# Patient Record
Sex: MALE | Race: WHITE | NOT HISPANIC OR LATINO | Employment: FULL TIME | URBAN - NONMETROPOLITAN AREA
[De-identification: names, ages, dates, MRNs, and addresses within clinical notes are randomized per-mention and may not be internally consistent; named-entity substitution may affect disease eponyms.]

---

## 2022-05-17 ENCOUNTER — OFFICE VISIT (OUTPATIENT)
Dept: URGENT CARE | Facility: CLINIC | Age: 22
End: 2022-05-17
Payer: COMMERCIAL

## 2022-05-17 ENCOUNTER — APPOINTMENT (OUTPATIENT)
Dept: RADIOLOGY | Facility: CLINIC | Age: 22
End: 2022-05-17
Payer: COMMERCIAL

## 2022-05-17 VITALS
HEART RATE: 81 BPM | TEMPERATURE: 98.8 F | DIASTOLIC BLOOD PRESSURE: 73 MMHG | BODY MASS INDEX: 30.43 KG/M2 | WEIGHT: 212.6 LBS | RESPIRATION RATE: 15 BRPM | OXYGEN SATURATION: 100 % | HEIGHT: 70 IN | SYSTOLIC BLOOD PRESSURE: 145 MMHG

## 2022-05-17 DIAGNOSIS — S93.401A SPRAIN OF RIGHT ANKLE, UNSPECIFIED LIGAMENT, INITIAL ENCOUNTER: Primary | ICD-10-CM

## 2022-05-17 DIAGNOSIS — S93.401A SPRAIN OF RIGHT ANKLE, UNSPECIFIED LIGAMENT, INITIAL ENCOUNTER: ICD-10-CM

## 2022-05-17 PROCEDURE — 73610 X-RAY EXAM OF ANKLE: CPT

## 2022-05-17 PROCEDURE — 99203 OFFICE O/P NEW LOW 30 MIN: CPT

## 2022-05-17 PROCEDURE — 73630 X-RAY EXAM OF FOOT: CPT

## 2022-05-17 RX ORDER — IBUPROFEN 400 MG/1
400 TABLET ORAL ONCE
Status: COMPLETED | OUTPATIENT
Start: 2022-05-17 | End: 2022-05-17

## 2022-05-17 RX ADMIN — IBUPROFEN 400 MG: 400 TABLET ORAL at 17:50

## 2022-05-17 NOTE — LETTER
May 17, 2022     Patient: Heriberto Julian   YOB: 2000   Date of Visit: 5/17/2022       To Whom It May Concern: It is my medical opinion that Heriberto Julian may return to work on 5/19/2022  If you have any questions or concerns, please don't hesitate to call           Sincerely,        [facility]    CC: No Recipients

## 2022-05-17 NOTE — PROGRESS NOTES
[address] [facility]        NAME: Doug Harrison is a 23 y o  male  : 2000    MRN: [id number]  DATE: May 17, 2022  TIME: 7:05 PM    Assessment and Plan   Sprain of right ankle, unspecified ligament, initial encounter [S93 401A]  1  Sprain of right ankle, unspecified ligament, initial encounter  XR ankle 3+ vw right    ibuprofen (MOTRIN) tablet 400 mg    XR foot 3+ vw right    Elastic Bandages & Supports (Aircast AirSport Ankle Brace) MISC     Motrin provided in office for pain and swelling  Ice pack applied  No acute osseous abnormalities noted on x-ray of foot or ankle  Patient placed in air cast splint  RICE    Patient Instructions     No acute fracture on xray  Will follow with radiology read  Use aircast for support  Rest, ice and elevate the ankle  Take tylenol and motrin as directed as needed for pain  Follow up with PCP in 3-5 days  Proceed to  ER if symptoms worsen  Chief Complaint     Chief Complaint   Patient presents with    Ankle Pain     Right ankle pain s/p twisting         History of Present Illness       Patient is a 73-year-old male who presents to the office today for right ankle pain and swelling  He states that he was walking down the steps of his girlfriend's house when he got to the bottom step and tripped over a walker for 1 of her friends twisting his right ankle and falling onto his right knee  He denies any head strike or any other injury  He states the injury occurred about 1 hour ago  He is able to ambulate on the leg without difficulty  He has not taken anything for pain  He denies any prior injury to this ankle or foot  He denies any numbness or tingling or instability  Review of Systems   Review of Systems   Constitutional: Negative for chills, fatigue and fever  Musculoskeletal: Positive for joint swelling  Negative for gait problem  Skin: Negative for rash and wound  Neurological: Negative for dizziness, light-headedness and headaches     All other systems reviewed and are negative  Current Medications       Current Outpatient Medications:     Elastic Bandages & Supports (Aircast AirSport Ankle Brace) MISC, Use 1 (one) time for 1 dose, Disp: 1 each, Rfl: 0  No current facility-administered medications for this visit  Current Allergies     Allergies as of 05/17/2022 - Reviewed 05/17/2022   Allergen Reaction Noted    Coconut oil - food allergy Facial Swelling 05/17/2022            The following portions of the patient's history were reviewed and updated as appropriate: allergies, current medications, past family history, past medical history, past social history, past surgical history and problem list      History reviewed  No pertinent past medical history  Past Surgical History:   Procedure Laterality Date    ANKLE SURGERY Left        History reviewed  No pertinent family history  Medications have been verified  Objective   /73   Pulse 81   Temp 98 8 °F (37 1 °C)   Resp 15   Ht 5' 9 75" (1 772 m)   Wt 96 4 kg (212 lb 9 6 oz)   SpO2 100%   BMI 30 72 kg/m²        Physical Exam     Physical Exam  Vitals and nursing note reviewed  Constitutional:       General: He is not in acute distress  Appearance: Normal appearance  He is normal weight  He is not ill-appearing or toxic-appearing  Cardiovascular:      Rate and Rhythm: Normal rate and regular rhythm  Pulses: Normal pulses  Heart sounds: Normal heart sounds  No murmur heard  No friction rub  No gallop  Pulmonary:      Effort: Pulmonary effort is normal  No respiratory distress  Breath sounds: Normal breath sounds  No stridor  No wheezing, rhonchi or rales  Chest:      Chest wall: No tenderness  Musculoskeletal:         General: Swelling, tenderness and signs of injury present  No deformity  Right lower leg: No edema  Left lower leg: No edema  Right ankle: Swelling present  No deformity, ecchymosis or lacerations  Tenderness present over the lateral malleolus and base of 5th metatarsal  No medial malleolus, ATF ligament, AITF ligament, CF ligament, posterior TF ligament or proximal fibula tenderness  Normal range of motion  Anterior drawer test negative  Normal pulse  Right Achilles Tendon: Normal       Right foot: Normal capillary refill  Tenderness present  No swelling, deformity or bunion  Normal pulse  Feet:       Comments: Cap refill <2s  Pedal pulses +2 and equal bilaterally  full ROM of ankle and foot noted  Sensation intact  Fungal toenails noted bilaterally  Skin:     General: Skin is warm and dry  Capillary Refill: Capillary refill takes less than 2 seconds  Findings: No erythema  Neurological:      General: No focal deficit present  Mental Status: He is alert and oriented to person, place, and time  Psychiatric:         Mood and Affect: Mood normal          Behavior: Behavior normal          Thought Content:  Thought content normal          Judgment: Judgment normal

## 2022-05-17 NOTE — PATIENT INSTRUCTIONS
No acute fracture on xray  Will follow with radiology read  Use aircast for support  Rest, ice and elevate the ankle  Take tylenol and motrin as directed as needed for pain

## 2022-08-21 ENCOUNTER — OFFICE VISIT (OUTPATIENT)
Dept: URGENT CARE | Facility: CLINIC | Age: 22
End: 2022-08-21
Payer: COMMERCIAL

## 2022-08-21 ENCOUNTER — APPOINTMENT (OUTPATIENT)
Dept: RADIOLOGY | Facility: MEDICAL CENTER | Age: 22
End: 2022-08-21
Payer: COMMERCIAL

## 2022-08-21 VITALS
BODY MASS INDEX: 30.81 KG/M2 | SYSTOLIC BLOOD PRESSURE: 131 MMHG | OXYGEN SATURATION: 99 % | HEART RATE: 68 BPM | DIASTOLIC BLOOD PRESSURE: 67 MMHG | RESPIRATION RATE: 20 BRPM | TEMPERATURE: 98.5 F | HEIGHT: 69 IN | WEIGHT: 208 LBS

## 2022-08-21 DIAGNOSIS — S40.022A CONTUSION OF LEFT UPPER EXTREMITY, INITIAL ENCOUNTER: ICD-10-CM

## 2022-08-21 DIAGNOSIS — S40.022A CONTUSION OF LEFT UPPER EXTREMITY, INITIAL ENCOUNTER: Primary | ICD-10-CM

## 2022-08-21 DIAGNOSIS — S40.811A ABRASION OF RIGHT UPPER EXTREMITY, INITIAL ENCOUNTER: ICD-10-CM

## 2022-08-21 PROCEDURE — 73090 X-RAY EXAM OF FOREARM: CPT

## 2022-08-21 PROCEDURE — 99214 OFFICE O/P EST MOD 30 MIN: CPT | Performed by: PHYSICIAN ASSISTANT

## 2022-08-21 NOTE — PROGRESS NOTES
[facility]        NAME: Daniel Richard is a 23 y o  male  : 2000    MRN: [id number]  DATE: 2022  TIME: 2:38 PM    Assessment and Plan   Contusion of left upper extremity, initial encounter [S40 022A]  1  Contusion of left upper extremity, initial encounter  Ambulatory Referral to Family Practice    diclofenac sodium (VOLTAREN) 50 mg EC tablet    CANCELED: XR forearm 2 vw left   2  Abrasion of right upper extremity, initial encounter  CANCELED: XR forearm 2 vw left         Patient Instructions   Patient Instructions   Abrasion   WHAT YOU NEED TO KNOW:   An abrasion is a wound on your skin  Abrasions usually happen when your skin rubs against a rough surface  Examples of an abrasion include rug burn, a skinned elbow, or road rash  Abrasions can be deep or shallow The wound may hurt, bleed, bruise, or swell  DISCHARGE INSTRUCTIONS:   Return to the emergency department if:   · The bleeding does not stop after 10 minutes of firm pressure  · The redness around your wound begins to spread  · You cannot rinse one or more foreign objects out of your wound  Call your doctor if:   · You have a fever or chills  · Your abrasion is red, warm, swollen, or draining pus  · You have questions or concerns about your condition or care  Care for your abrasion:   · Wash your hands and dry them with a clean towel before first     · Press a clean cloth against your wound for 5 to 10 minutes to stop any bleeding  · Rinse your wound with clean water  Do not use harsh soap, alcohol, or iodine solutions  · Use a clean, wet cloth to remove any objects, such as small pieces of rocks or dirt  · Rub antibiotic ointment on your wound  This may help prevent infection and help your wound heal     · Cover the wound with a non-stick bandage  Change the bandage daily, and if it gets wet or dirty      Follow up with your doctor as directed:  Write down your questions so you remember to ask them during your visits  © Copyright [employer] 2022 Information is for End User's use only and may not be sold, redistributed or otherwise used for commercial purposes  All illustrations and images included in CareNotes® are the copyrighted property of [employer]  or [employer]  The above information is an  only  It is not intended as medical advice for individual conditions or treatments  Talk to your doctor, nurse or pharmacist before following any medical regimen to see if it is safe and effective for you  Follow up with PCP in 3-5 days  Proceed to  ER if symptoms worsen  Chief Complaint     Chief Complaint   Patient presents with    Arm Pain     [facility] Friday          History of Present Illness       The patient presents for an injury to the right forearm  He states that he slipped on a banana peel inside his house and fell backwards onto berks inside his house  The patient sustained an abrasion to the medial side of his right forearm  He states he also has pain in the right medial forearm  He describes the pain as an aching pain that is approximately 3/10 and worse with pressure  The pain is intermittent  He denies associated numbness  The patient is up-to-date on his tetanus  He states he has been covering the wound  He denies any other injury  Review of Systems   Review of Systems   Musculoskeletal: Positive for arthralgias and myalgias  Skin: Positive for wound          Left arm         Current Medications       Current Outpatient Medications:     diclofenac sodium (VOLTAREN) 50 mg EC tablet, Take 1 tablet (50 mg total) by mouth 2 (two) times a day for 7 days, Disp: 14 tablet, Rfl: 0    Current Allergies     Allergies as of 08/21/2022 - Reviewed 08/21/2022   Allergen Reaction Noted    Coconut oil - food allergy Facial Swelling 05/17/2022            The following portions of the patient's history were reviewed and updated as appropriate: allergies, current medications, past family history, past medical history, past social history, past surgical history and problem list      Past Medical History:   Diagnosis Date    ADHD (attention deficit hyperactivity disorder)        Past Surgical History:   Procedure Laterality Date    ANKLE SURGERY Left     ANKLE SURGERY Left        History reviewed  No pertinent family history  Medications have been verified  Objective   /67   Pulse 68   Temp 98 5 °F (36 9 °C)   Resp 20   Ht 5' 9" (1 753 m)   Wt 94 3 kg (208 lb)   SpO2 99%   BMI 30 72 kg/m²        Physical Exam     Physical Exam  Constitutional:       Appearance: He is well-developed  HENT:      Head: Normocephalic  Eyes:      Pupils: Pupils are equal, round, and reactive to light  Neck:      Thyroid: No thyromegaly  Trachea: No tracheal deviation  Cardiovascular:      Heart sounds: No murmur heard  Pulmonary:      Effort: Pulmonary effort is normal    Chest:      Chest wall: No tenderness  Musculoskeletal:         General: Normal range of motion  Right shoulder: Normal       Right upper arm: Normal       Right elbow: No swelling or lacerations  Normal range of motion  No tenderness  Right forearm: Swelling and tenderness present  No deformity or bony tenderness  Right wrist: No swelling, tenderness or crepitus  Normal pulse  Right hand: No swelling  Cervical back: Normal range of motion  Skin:     General: Skin is warm  Comments: Patient has a abrasion noted on the medial aspect of the right forearm that is approximately 2 cm x 2 cm in size  There is no signs of infection or bleeding  He has tenderness noted at the right medial forearm  The patient has no injury to the right elbow, right wrist, or right hand  Neurological:      Mental Status: He is alert               X-ray right forearm:    FINDINGS:     There is no acute fracture or dislocation      No significant degenerative changes      No lytic or blastic osseous lesion      Soft tissues are unremarkable      IMPRESSION:     No acute osseous abnormality    -patient will be given Voltaren as needed for pain  I instructed him to use ice and keep the wound covered  The patient will monitor for signs of infection  He was instructed to follow-up if symptoms fail to improve  He was instructed to go to the ER if symptoms worsen

## 2022-08-21 NOTE — PATIENT INSTRUCTIONS
Abrasion   WHAT YOU NEED TO KNOW:   An abrasion is a wound on your skin  Abrasions usually happen when your skin rubs against a rough surface  Examples of an abrasion include rug burn, a skinned elbow, or road rash  Abrasions can be deep or shallow The wound may hurt, bleed, bruise, or swell  DISCHARGE INSTRUCTIONS:   Return to the emergency department if:   The bleeding does not stop after 10 minutes of firm pressure  The redness around your wound begins to spread  You cannot rinse one or more foreign objects out of your wound  Call your doctor if:   You have a fever or chills  Your abrasion is red, warm, swollen, or draining pus  You have questions or concerns about your condition or care  Care for your abrasion:   Wash your hands and dry them with a clean towel before first     Press a clean cloth against your wound for 5 to 10 minutes to stop any bleeding  Rinse your wound with clean water  Do not use harsh soap, alcohol, or iodine solutions  Use a clean, wet cloth to remove any objects, such as small pieces of rocks or dirt  Rub antibiotic ointment on your wound  This may help prevent infection and help your wound heal     Cover the wound with a non-stick bandage  Change the bandage daily, and if it gets wet or dirty  Follow up with your doctor as directed:  Write down your questions so you remember to ask them during your visits  © Copyright [employer] 2022 Information is for End User's use only and may not be sold, redistributed or otherwise used for commercial purposes  All illustrations and images included in CareNotes® are the copyrighted property of [employer]  or Evan Mak  The above information is an  only  It is not intended as medical advice for individual conditions or treatments  Talk to your doctor, nurse or pharmacist before following any medical regimen to see if it is safe and effective for you

## 2023-05-17 ENCOUNTER — OFFICE VISIT (OUTPATIENT)
Dept: URGENT CARE | Facility: CLINIC | Age: 23
End: 2023-05-17

## 2023-05-17 VITALS
WEIGHT: 207 LBS | SYSTOLIC BLOOD PRESSURE: 144 MMHG | TEMPERATURE: 98.5 F | HEIGHT: 70 IN | DIASTOLIC BLOOD PRESSURE: 80 MMHG | HEART RATE: 90 BPM | OXYGEN SATURATION: 100 % | BODY MASS INDEX: 29.63 KG/M2 | RESPIRATION RATE: 18 BRPM

## 2023-05-17 DIAGNOSIS — Z02.4 DRIVER'S PERMIT PE (PHYSICAL EXAMINATION): Primary | ICD-10-CM

## 2023-05-17 NOTE — PROGRESS NOTES
[address] [facility]        NAME: Rebeca El is a 26 y o  male  : 2000    MRN: [id number]  DATE: May 17, 2023  TIME: 3:20 PM    Assessment and Plan   's permit PE (physical examination) [Z02 4]  1  's permit PE (physical examination)          Normal exam    Patient Instructions       Follow up with PCP in 3-5 days  Proceed to  ER if symptoms worsen  Chief Complaint     Chief Complaint   Patient presents with   • Annual Exam     DL Physical          History of Present Illness       Patient has no significant past medical history  Patient denies any history of cognitive disorders, neurological disorders, epilepsy, diabetes, cardiac disorders, hypertension, alcohol or drug abuse  Patient does not wear corrective lenses  Patient offers no complaints today  Review of Systems   Review of Systems   Constitutional: Negative for chills and fever  HENT: Negative for ear pain and sore throat  Eyes: Negative for pain and visual disturbance  Respiratory: Negative for cough and shortness of breath  Cardiovascular: Negative for chest pain and palpitations  Gastrointestinal: Negative for abdominal pain and vomiting  Genitourinary: Negative for dysuria and hematuria  Musculoskeletal: Negative for arthralgias and back pain  Skin: Negative for color change and rash  Neurological: Negative for seizures and syncope  All other systems reviewed and are negative  Current Medications     No current outpatient medications on file      Current Allergies     Allergies as of 2023 - Reviewed 2023   Allergen Reaction Noted   • Coconut oil - food allergy Facial Swelling 2022            The following portions of the patient's history were reviewed and updated as appropriate: allergies, current medications, past family history, past medical history, past social history, past surgical history and problem list      Past Medical History:   Diagnosis Date   • ADHD "(attention deficit hyperactivity disorder)        Past Surgical History:   Procedure Laterality Date   • ANKLE SURGERY Left    • ANKLE SURGERY Left        History reviewed  No pertinent family history  Medications have been verified  Objective   /80   Pulse 90   Temp 98 5 °F (36 9 °C)   Resp 18   Ht 5' 10\" (1 778 m)   Wt 93 9 kg (207 lb)   SpO2 100%   BMI 29 70 kg/m²        Physical Exam     Physical Exam  Vitals and nursing note reviewed  Constitutional:       General: He is not in acute distress  Appearance: Normal appearance  He is normal weight  He is not ill-appearing or toxic-appearing  HENT:      Head: Normocephalic and atraumatic  Right Ear: Tympanic membrane normal       Left Ear: Tympanic membrane normal       Nose: Nose normal       Mouth/Throat:      Mouth: Mucous membranes are moist    Eyes:      Extraocular Movements: Extraocular movements intact  Conjunctiva/sclera: Conjunctivae normal       Pupils: Pupils are equal, round, and reactive to light  Neck:      Vascular: No carotid bruit  Cardiovascular:      Rate and Rhythm: Normal rate and regular rhythm  Pulses: Normal pulses  Heart sounds: Normal heart sounds  No murmur heard  No friction rub  No gallop  Pulmonary:      Effort: Pulmonary effort is normal       Breath sounds: Normal breath sounds  No stridor  No wheezing or rhonchi  Abdominal:      General: Bowel sounds are normal  There is no distension  Palpations: Abdomen is soft  Tenderness: There is no abdominal tenderness  Hernia: No hernia is present  Musculoskeletal:         General: Normal range of motion  Cervical back: No tenderness  Skin:     General: Skin is warm and dry  Capillary Refill: Capillary refill takes less than 2 seconds  Findings: No rash  Neurological:      General: No focal deficit present  Mental Status: He is alert and oriented to person, place, and time        Cranial " Nerves: No cranial nerve deficit  Sensory: No sensory deficit  Motor: No weakness  Coordination: Coordination normal       Gait: Gait normal       Deep Tendon Reflexes: Reflexes normal    Psychiatric:         Mood and Affect: Mood normal          Behavior: Behavior normal          Thought Content:  Thought content normal          Judgment: Judgment normal